# Patient Record
Sex: MALE | Race: WHITE | NOT HISPANIC OR LATINO | URBAN - NONMETROPOLITAN AREA
[De-identification: names, ages, dates, MRNs, and addresses within clinical notes are randomized per-mention and may not be internally consistent; named-entity substitution may affect disease eponyms.]

---

## 2017-08-01 PROBLEM — H35.372: Noted: 2017-08-01

## 2017-08-01 PROBLEM — Z96.1: Noted: 2017-08-01

## 2019-08-19 ENCOUNTER — IMPORTED ENCOUNTER (OUTPATIENT)
Dept: URBAN - NONMETROPOLITAN AREA CLINIC 1 | Facility: CLINIC | Age: 74
End: 2019-08-19

## 2019-08-19 PROCEDURE — 92014 COMPRE OPH EXAM EST PT 1/>: CPT

## 2019-08-19 PROCEDURE — 92015 DETERMINE REFRACTIVE STATE: CPT

## 2020-10-09 ENCOUNTER — IMPORTED ENCOUNTER (OUTPATIENT)
Dept: URBAN - NONMETROPOLITAN AREA CLINIC 1 | Facility: CLINIC | Age: 75
End: 2020-10-09

## 2020-10-09 PROBLEM — H35.373: Noted: 2020-10-09

## 2020-10-09 PROBLEM — Z96.1: Noted: 2020-10-09

## 2020-10-09 PROBLEM — H52.13: Noted: 2020-10-09

## 2020-10-09 PROBLEM — H52.4: Noted: 2020-10-09

## 2020-10-09 PROCEDURE — 92014 COMPRE OPH EXAM EST PT 1/>: CPT

## 2020-10-09 PROCEDURE — 92015 DETERMINE REFRACTIVE STATE: CPT

## 2020-10-09 NOTE — PATIENT DISCUSSION
Myopia/astigmatism/Presbyopia Discussed diagnosis with patient. Explained that people who are myopic are at a higher risk for developing RD/RT and reviewed associated S&S. Pt to contact our office if symptoms develop. Updated spec Rx given. Recommend lens that will provide comfort as well as protect safety and health of eyes. ERM OUstable continue to [id number] continue to monitor

## 2021-10-11 ENCOUNTER — IMPORTED ENCOUNTER (OUTPATIENT)
Dept: URBAN - NONMETROPOLITAN AREA CLINIC 1 | Facility: CLINIC | Age: 76
End: 2021-10-11

## 2021-10-11 PROCEDURE — 92014 COMPRE OPH EXAM EST PT 1/>: CPT

## 2021-10-11 PROCEDURE — 92015 DETERMINE REFRACTIVE STATE: CPT

## 2021-10-11 PROCEDURE — 92134 CPTRZ OPH DX IMG PST SGM RTA: CPT

## 2021-10-11 NOTE — PATIENT DISCUSSION
ERM OU-  discussed findings w/patient-  stable findings noted at this time-  OCT Mac done 10/11/2021    OD: 1+ ERM stable    OS: 1+ ERM stable-  no treatment indicated at this time-  continue to monitor yearly w/OCT Mac or prnPseudophakia w/Open Capsules OU-  discussed findings w/patient-  stable findings at this time-  continue to monitor yearly or prnPVD OU-  discussed findings w/patient-  Retina flat 360 with no breaks tears or heme. -  S&S of RD/RT reviewed with pt. -  Stressed that pt should contact our office right away with any changes or increase in symptoms.-  RTC 1 year or prnMixed Astigmatism OU w/Presbyopia-  discussed findings w/patient-  new spectacle Rx issued-  continue to monitor yearly or prn; 's Notes: MR 10/11/2021DFE 10/11/2021OCT Mac 10/11/2021

## 2021-10-22 PROBLEM — H52.223: Noted: 2021-10-22

## 2021-10-22 PROBLEM — H52.4: Noted: 2021-10-22

## 2021-10-22 PROBLEM — H43.813: Noted: 2021-10-22

## 2021-10-22 PROBLEM — H35.373: Noted: 2021-10-11

## 2021-10-22 PROBLEM — Z96.1: Noted: 2021-10-11

## 2021-10-22 PROBLEM — H52.03: Noted: 2021-10-22

## 2022-04-09 ASSESSMENT — VISUAL ACUITY
OD_SC: 20/20-1
OU_SC: 20/20
OS_SC: 20/20
OS_SC: 20/20-2
OS_SC: 20/25-1
OS_CC: 20/30+2
OD_CC: 20/25+2
OD_SC: 20/20

## 2022-04-09 ASSESSMENT — TONOMETRY
OS_IOP_MMHG: 15
OS_IOP_MMHG: 15
OD_IOP_MMHG: 15
OD_IOP_MMHG: 13
OD_IOP_MMHG: 17
OS_IOP_MMHG: 14

## 2022-05-26 NOTE — PATIENT DISCUSSION
Likely exacerbated by recent scopalamine exposure OD. No residual mydriasis present. Eye likely still achy due to dryness. Start PF artificial tears BID-TID. Use Tylenol PRN. Follow up as needed.

## 2022-11-10 ENCOUNTER — COMPREHENSIVE EXAM (OUTPATIENT)
Dept: URBAN - NONMETROPOLITAN AREA CLINIC 4 | Facility: CLINIC | Age: 77
End: 2022-11-10

## 2022-11-10 DIAGNOSIS — H35.373: ICD-10-CM

## 2022-11-10 PROCEDURE — 99214 OFFICE O/P EST MOD 30 MIN: CPT

## 2022-11-10 PROCEDURE — 92134 CPTRZ OPH DX IMG PST SGM RTA: CPT

## 2022-11-10 ASSESSMENT — VISUAL ACUITY
OD_CC: 20/20
OS_CC: 20/20
OU_CC: J1+
OU_CC: 20/20

## 2022-11-10 ASSESSMENT — TONOMETRY
OS_IOP_MMHG: 16
OD_IOP_MMHG: 15